# Patient Record
Sex: FEMALE | Race: WHITE | Employment: OTHER | URBAN - METROPOLITAN AREA
[De-identification: names, ages, dates, MRNs, and addresses within clinical notes are randomized per-mention and may not be internally consistent; named-entity substitution may affect disease eponyms.]

---

## 2018-03-09 ENCOUNTER — OFFICE VISIT (OUTPATIENT)
Dept: FAMILY MEDICINE CLINIC | Age: 58
End: 2018-03-09

## 2018-03-09 ENCOUNTER — TELEPHONE (OUTPATIENT)
Dept: FAMILY MEDICINE CLINIC | Age: 58
End: 2018-03-09

## 2018-03-09 VITALS
WEIGHT: 139.8 LBS | HEART RATE: 62 BPM | OXYGEN SATURATION: 98 % | SYSTOLIC BLOOD PRESSURE: 126 MMHG | TEMPERATURE: 97.9 F | HEIGHT: 62 IN | RESPIRATION RATE: 12 BRPM | BODY MASS INDEX: 25.73 KG/M2 | DIASTOLIC BLOOD PRESSURE: 74 MMHG

## 2018-03-09 DIAGNOSIS — E03.9 ACQUIRED HYPOTHYROIDISM: Primary | ICD-10-CM

## 2018-03-09 DIAGNOSIS — N95.1 VAGINAL DRYNESS, MENOPAUSAL: ICD-10-CM

## 2018-03-09 DIAGNOSIS — K58.0 IRRITABLE BOWEL SYNDROME WITH DIARRHEA: ICD-10-CM

## 2018-03-09 DIAGNOSIS — Z85.820 PERSONAL HISTORY OF MALIGNANT MELANOMA OF SKIN: ICD-10-CM

## 2018-03-09 DIAGNOSIS — Z12.39 BREAST CANCER SCREENING: ICD-10-CM

## 2018-03-09 DIAGNOSIS — E03.9 ACQUIRED HYPOTHYROIDISM: ICD-10-CM

## 2018-03-09 DIAGNOSIS — Z77.011 LEAD EXPOSURE: ICD-10-CM

## 2018-03-09 LAB
CORTISOL TOTAL: 7.3 UG/DL
T4 FREE: 1.6 NG/DL (ref 0.9–1.8)
TSH REFLEX FT4: 0.05 UIU/ML (ref 0.27–4.2)

## 2018-03-09 PROCEDURE — G8484 FLU IMMUNIZE NO ADMIN: HCPCS | Performed by: FAMILY MEDICINE

## 2018-03-09 PROCEDURE — 1036F TOBACCO NON-USER: CPT | Performed by: FAMILY MEDICINE

## 2018-03-09 PROCEDURE — 3017F COLORECTAL CA SCREEN DOC REV: CPT | Performed by: FAMILY MEDICINE

## 2018-03-09 PROCEDURE — 99203 OFFICE O/P NEW LOW 30 MIN: CPT | Performed by: FAMILY MEDICINE

## 2018-03-09 PROCEDURE — 3014F SCREEN MAMMO DOC REV: CPT | Performed by: FAMILY MEDICINE

## 2018-03-09 PROCEDURE — G8427 DOCREV CUR MEDS BY ELIG CLIN: HCPCS | Performed by: FAMILY MEDICINE

## 2018-03-09 PROCEDURE — G8419 CALC BMI OUT NRM PARAM NOF/U: HCPCS | Performed by: FAMILY MEDICINE

## 2018-03-09 RX ORDER — LIOTHYRONINE SODIUM 5 UG/1
5 TABLET ORAL DAILY
Refills: 0 | COMMUNITY
Start: 2018-03-09 | End: 2018-03-11

## 2018-03-09 RX ORDER — ESTRADIOL 0.1 MG/D
1 FILM, EXTENDED RELEASE TRANSDERMAL
Refills: 0 | COMMUNITY
Start: 2018-03-12 | End: 2018-03-09

## 2018-03-09 ASSESSMENT — ENCOUNTER SYMPTOMS
RESPIRATORY NEGATIVE: 1
DIARRHEA: 1

## 2018-03-09 NOTE — PROGRESS NOTES
Maryan Romo   YOB: 1960    Date of Visit:  3/9/2018        Chief Complaint   Patient presents with    Established New Doctor     New patient visit     Patient is a 50-year-old female with history of chronic diarrhea, postmenopausal atrophic vaginitis, hypothyroidism and melanoma resending to establish primary care. Patient transitioning PCP due to her prior provider no longer within the insurance network. HPI:       Hypothyroidism. Diagnosed many years ago. Currently treated with levothyroxine and Cytomel. Denies symptoms/concerns. Endorsing adherence to medications. No side effects associated with current treatment. Reports to prior doctor checking cortisol levels which has been persistently high and requests recheck. History of melanoma of the scalp. Status post excision about a decade ago. Denies any new skin lesions. She inquires whether the melanoma had stemmed from food allergies. Gets annual skin checks by dermatology. Requesting referral to establish with new dermatologist due to change in insurance. Atrophic vaginitis. Was on estradiol patch but no longer using. Complaints of symptoms consisting of dryness and pain with sexual intercourse. Reports to symptoms affecting her sexual life at this time. Requests referral to gynecology. Chronic diarrhea. Problem has been waxing and waning since teenage. 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 Symptoms most bothersome in the mornings. Supposedly had a colonoscopy when she turned 49years of age with normal results and is not due to follow-up for 10 years. Prefers not to take medication for the condition. History of lead exposure. Diagnosed a few years ago. Denies current risk factors. Requesting recheck of lead levels.           No Known Allergies        Outpatient Prescriptions Marked as Taking for the 3/9/18 encounter (Office Visit) with Mai Valadez MD   Medication Sig Dispense Refill    estradiol (VIVELLE) 0.1 MG/24HR Place 1 patch onto the skin Twice a Week  0    levothyroxine (SYNTHROID) 88 MCG tablet Take 88 mcg by mouth Daily             Patient's past medical, surgical, social and family histories were reviewed and updated as appropriate. Review of Systems   Constitutional: Negative. HENT: Negative. Respiratory: Negative. Cardiovascular: Negative. Gastrointestinal: Positive for diarrhea (chronic). Musculoskeletal: Negative. Neurological: Negative. Physical Exam   Constitutional: She appears well-developed and well-nourished. No distress. HENT:   Head: Normocephalic and atraumatic. Right Ear: Hearing and external ear normal.   Left Ear: Hearing and external ear normal.   Nose: Nose normal. No rhinorrhea. Eyes: Conjunctivae and EOM are normal. No scleral icterus. Neck: Neck supple. No JVD present. No thyromegaly present. Cardiovascular: Normal rate, regular rhythm and intact distal pulses. Pulmonary/Chest: Effort normal and breath sounds normal.   Abdominal: Soft. Bowel sounds are normal.   Neurological: She is alert. She is not disoriented. Psychiatric: She has a normal mood and affect. Her behavior is normal.   Vitals reviewed. Vitals:    03/09/18 0822   BP: 126/74   Site: Left Arm   Position: Sitting   Cuff Size: Medium Adult   Pulse: 62   Resp: 12   Temp: 97.9 °F (36.6 °C)   TempSrc: Oral   SpO2: 98%   Weight: 139 lb 12.8 oz (63.4 kg)   Height: 5' 2\" (1.575 m)     Body mass index is 25.57 kg/m². Wt Readings from Last 3 Encounters:   03/09/18 139 lb 12.8 oz (63.4 kg)   05/28/15 133 lb (60.3 kg)     BP Readings from Last 3 Encounters:   03/09/18 126/74   05/28/15 127/70            Assessment/Plan     1. Hypothyroidism  - Continue levothyroxine and Cytomel. Check labs. 2. Personal history of malignant melanoma of skin  - Status post excision in 2007.  Dermatology referral for annual skin exam, Brooks Marroquin MD.    3. Vaginal dryness, menopausal  - Off estradiol. Gynecology referral as patient requests, Daya Resendiz MD    4. Irritable bowel syndrome with diarrhea  - Declines medication therapy. Recommend increasing daily fiber intake and maintain adequate hydration. 5. History of lead exposure  - Check serum lead level. 6. Breast cancer screening  - Mammography Screening            Discussed medications with patient, who voiced understanding of their use and indications. All questions answered. Return in about 6 months (around 9/9/2018) for Hypothyroid. Return sometime for annual physical +/- Pap. Please note that some or all of this record was generated using voice recognition software. If there are any questions about the content of this document, please contact the author as some errors in transcription may have occurred.

## 2018-03-11 DIAGNOSIS — E03.9 ACQUIRED HYPOTHYROIDISM: Primary | ICD-10-CM

## 2018-03-11 RX ORDER — LEVOTHYROXINE SODIUM 0.05 MG/1
50 TABLET ORAL DAILY
Qty: 90 TABLET | Refills: 0 | Status: SHIPPED | OUTPATIENT
Start: 2018-03-11 | End: 2018-04-30 | Stop reason: SDUPTHER

## 2018-03-12 DIAGNOSIS — E03.9 ACQUIRED HYPOTHYROIDISM: Primary | ICD-10-CM

## 2018-03-12 LAB — LEAD LEVEL BLOOD: <2 UG/DL (ref 0–4.9)

## 2018-03-16 ENCOUNTER — TELEPHONE (OUTPATIENT)
Dept: FAMILY MEDICINE CLINIC | Age: 58
End: 2018-03-16

## 2018-04-03 ENCOUNTER — TELEPHONE (OUTPATIENT)
Dept: FAMILY MEDICINE CLINIC | Age: 58
End: 2018-04-03

## 2018-04-13 ENCOUNTER — OFFICE VISIT (OUTPATIENT)
Dept: FAMILY MEDICINE CLINIC | Age: 58
End: 2018-04-13

## 2018-04-13 VITALS
HEIGHT: 63 IN | SYSTOLIC BLOOD PRESSURE: 124 MMHG | HEART RATE: 56 BPM | WEIGHT: 142.8 LBS | TEMPERATURE: 97.7 F | DIASTOLIC BLOOD PRESSURE: 80 MMHG | BODY MASS INDEX: 25.3 KG/M2 | OXYGEN SATURATION: 98 % | RESPIRATION RATE: 16 BRPM

## 2018-04-13 DIAGNOSIS — Z00.00 ANNUAL PHYSICAL EXAM: Primary | ICD-10-CM

## 2018-04-13 PROCEDURE — 99396 PREV VISIT EST AGE 40-64: CPT | Performed by: FAMILY MEDICINE

## 2018-04-13 RX ORDER — LIOTHYRONINE SODIUM 5 UG/1
TABLET ORAL
COMMUNITY
Start: 2018-03-26 | End: 2018-04-30 | Stop reason: SDUPTHER

## 2018-04-19 LAB
HPV COMMENT: NORMAL
HPV TYPE 16: NOT DETECTED
HPV TYPE 18: NOT DETECTED
HPVOH (OTHER TYPES): NOT DETECTED

## 2018-04-19 ASSESSMENT — ENCOUNTER SYMPTOMS
RESPIRATORY NEGATIVE: 1
EYES NEGATIVE: 1
GASTROINTESTINAL NEGATIVE: 1

## 2018-04-30 ENCOUNTER — TELEPHONE (OUTPATIENT)
Dept: FAMILY MEDICINE CLINIC | Age: 58
End: 2018-04-30

## 2018-04-30 DIAGNOSIS — E03.9 HYPOTHYROIDISM, UNSPECIFIED TYPE: Primary | ICD-10-CM

## 2018-04-30 RX ORDER — LIOTHYRONINE SODIUM 5 UG/1
5 TABLET ORAL DAILY
Qty: 90 TABLET | Refills: 0 | Status: SHIPPED | OUTPATIENT
Start: 2018-04-30 | End: 2018-09-17

## 2018-04-30 RX ORDER — LEVOTHYROXINE SODIUM 0.05 MG/1
50 TABLET ORAL DAILY
Qty: 90 TABLET | Refills: 0 | Status: SHIPPED | OUTPATIENT
Start: 2018-04-30

## 2018-05-01 ENCOUNTER — TELEPHONE (OUTPATIENT)
Dept: FAMILY MEDICINE CLINIC | Age: 58
End: 2018-05-01

## 2018-09-17 ENCOUNTER — HOSPITAL ENCOUNTER (OUTPATIENT)
Dept: NON INVASIVE DIAGNOSTICS | Age: 58
Discharge: HOME | End: 2018-09-17
Payer: COMMERCIAL

## 2018-09-17 LAB
LV EF: 58 %
LVEF MODALITY: NORMAL

## 2018-09-17 PROCEDURE — 93306 TTE W/DOPPLER COMPLETE: CPT

## 2018-12-07 ENCOUNTER — HOSPITAL ENCOUNTER (OUTPATIENT)
Dept: WOMENS IMAGING | Age: 58
Discharge: HOME | End: 2018-12-07
Payer: COMMERCIAL

## 2018-12-07 DIAGNOSIS — Z12.31 VISIT FOR SCREENING MAMMOGRAM: ICD-10-CM

## 2018-12-07 PROCEDURE — 77067 SCR MAMMO BI INCL CAD: CPT

## 2020-11-09 ENCOUNTER — HOSPITAL ENCOUNTER (EMERGENCY)
Age: 60
Discharge: HOME | End: 2020-11-09
Attending: EMERGENCY MEDICINE
Payer: COMMERCIAL

## 2020-11-09 ENCOUNTER — APPOINTMENT (OUTPATIENT)
Dept: CT IMAGING | Age: 60
End: 2020-11-09
Payer: COMMERCIAL

## 2020-11-09 ENCOUNTER — APPOINTMENT (OUTPATIENT)
Dept: GENERAL RADIOLOGY | Age: 60
End: 2020-11-09
Payer: COMMERCIAL

## 2020-11-09 VITALS
TEMPERATURE: 98.8 F | SYSTOLIC BLOOD PRESSURE: 152 MMHG | DIASTOLIC BLOOD PRESSURE: 72 MMHG | RESPIRATION RATE: 19 BRPM | HEART RATE: 55 BPM | OXYGEN SATURATION: 99 % | BODY MASS INDEX: 24.69 KG/M2 | WEIGHT: 135 LBS

## 2020-11-09 LAB
ALBUMIN SERPL-MCNC: 4.9 G/DL (ref 3.4–5)
ALP BLD-CCNC: 74 U/L (ref 40–129)
ALT SERPL-CCNC: 14 U/L (ref 10–40)
ANION GAP SERPL CALCULATED.3IONS-SCNC: 13 MMOL/L (ref 3–16)
AST SERPL-CCNC: 18 U/L (ref 15–37)
BASOPHILS ABSOLUTE: 0 K/UL (ref 0–0.2)
BASOPHILS RELATIVE PERCENT: 0.6 %
BILIRUB SERPL-MCNC: 0.8 MG/DL (ref 0–1)
BILIRUBIN DIRECT: <0.2 MG/DL (ref 0–0.3)
BILIRUBIN, INDIRECT: NORMAL MG/DL (ref 0–1)
BUN BLDV-MCNC: 16 MG/DL (ref 7–20)
CALCIUM SERPL-MCNC: 10.1 MG/DL (ref 8.3–10.6)
CHLORIDE BLD-SCNC: 100 MMOL/L (ref 99–110)
CO2: 25 MMOL/L (ref 21–32)
CREAT SERPL-MCNC: 0.9 MG/DL (ref 0.6–1.1)
EOSINOPHILS ABSOLUTE: 0.4 K/UL (ref 0–0.6)
EOSINOPHILS RELATIVE PERCENT: 4.9 %
GFR AFRICAN AMERICAN: >60
GFR NON-AFRICAN AMERICAN: >60
GLUCOSE BLD-MCNC: 116 MG/DL (ref 70–99)
HCG QUALITATIVE: NEGATIVE
HCT VFR BLD CALC: 40.6 % (ref 36–48)
HEMOGLOBIN: 13.6 G/DL (ref 12–16)
INR BLD: 0.89 (ref 0.86–1.14)
LIPASE: 66 U/L (ref 13–60)
LYMPHOCYTES ABSOLUTE: 1.5 K/UL (ref 1–5.1)
LYMPHOCYTES RELATIVE PERCENT: 20.7 %
MCH RBC QN AUTO: 30.4 PG (ref 26–34)
MCHC RBC AUTO-ENTMCNC: 33.4 G/DL (ref 31–36)
MCV RBC AUTO: 90.9 FL (ref 80–100)
MONOCYTES ABSOLUTE: 0.8 K/UL (ref 0–1.3)
MONOCYTES RELATIVE PERCENT: 11.1 %
NEUTROPHILS ABSOLUTE: 4.5 K/UL (ref 1.7–7.7)
NEUTROPHILS RELATIVE PERCENT: 62.7 %
PDW BLD-RTO: 12.9 % (ref 12.4–15.4)
PLATELET # BLD: 223 K/UL (ref 135–450)
PMV BLD AUTO: 7.9 FL (ref 5–10.5)
POTASSIUM SERPL-SCNC: 4.3 MMOL/L (ref 3.5–5.1)
PRO-BNP: 61 PG/ML (ref 0–124)
PROTHROMBIN TIME: 10.3 SEC (ref 10–13.2)
RBC # BLD: 4.47 M/UL (ref 4–5.2)
SODIUM BLD-SCNC: 138 MMOL/L (ref 136–145)
TOTAL PROTEIN: 7.4 G/DL (ref 6.4–8.2)
TROPONIN: <0.01 NG/ML
TROPONIN: <0.01 NG/ML
WBC # BLD: 7.2 K/UL (ref 4–11)

## 2020-11-09 PROCEDURE — 84484 ASSAY OF TROPONIN QUANT: CPT

## 2020-11-09 PROCEDURE — 71046 X-RAY EXAM CHEST 2 VIEWS: CPT

## 2020-11-09 PROCEDURE — 93005 ELECTROCARDIOGRAM TRACING: CPT | Performed by: EMERGENCY MEDICINE

## 2020-11-09 PROCEDURE — 2500000003 HC RX 250 WO HCPCS: Performed by: PHYSICIAN ASSISTANT

## 2020-11-09 PROCEDURE — 84703 CHORIONIC GONADOTROPIN ASSAY: CPT

## 2020-11-09 PROCEDURE — 80076 HEPATIC FUNCTION PANEL: CPT

## 2020-11-09 PROCEDURE — 85610 PROTHROMBIN TIME: CPT

## 2020-11-09 PROCEDURE — 96374 THER/PROPH/DIAG INJ IV PUSH: CPT

## 2020-11-09 PROCEDURE — 74177 CT ABD & PELVIS W/CONTRAST: CPT

## 2020-11-09 PROCEDURE — 85025 COMPLETE CBC W/AUTO DIFF WBC: CPT

## 2020-11-09 PROCEDURE — 2580000003 HC RX 258: Performed by: EMERGENCY MEDICINE

## 2020-11-09 PROCEDURE — 6370000000 HC RX 637 (ALT 250 FOR IP): Performed by: EMERGENCY MEDICINE

## 2020-11-09 PROCEDURE — 6360000004 HC RX CONTRAST MEDICATION: Performed by: PHYSICIAN ASSISTANT

## 2020-11-09 PROCEDURE — 83690 ASSAY OF LIPASE: CPT

## 2020-11-09 PROCEDURE — 83880 ASSAY OF NATRIURETIC PEPTIDE: CPT

## 2020-11-09 PROCEDURE — 80048 BASIC METABOLIC PNL TOTAL CA: CPT

## 2020-11-09 PROCEDURE — 99283 EMERGENCY DEPT VISIT LOW MDM: CPT

## 2020-11-09 RX ORDER — 0.9 % SODIUM CHLORIDE 0.9 %
500 INTRAVENOUS SOLUTION INTRAVENOUS ONCE
Status: COMPLETED | OUTPATIENT
Start: 2020-11-09 | End: 2020-11-09

## 2020-11-09 RX ADMIN — SODIUM CHLORIDE 500 ML: 9 INJECTION, SOLUTION INTRAVENOUS at 23:06

## 2020-11-09 RX ADMIN — FAMOTIDINE 20 MG: 10 INJECTION, SOLUTION INTRAVENOUS at 20:28

## 2020-11-09 RX ADMIN — LIDOCAINE HYDROCHLORIDE: 20 SOLUTION ORAL; TOPICAL at 23:06

## 2020-11-09 RX ADMIN — IOPAMIDOL 75 ML: 755 INJECTION, SOLUTION INTRAVENOUS at 21:34

## 2020-11-09 ASSESSMENT — ENCOUNTER SYMPTOMS
BACK PAIN: 0
DIARRHEA: 0
ABDOMINAL PAIN: 1
NAUSEA: 1
SHORTNESS OF BREATH: 0
CHEST TIGHTNESS: 0
VOMITING: 0

## 2020-11-09 ASSESSMENT — PAIN DESCRIPTION - LOCATION: LOCATION: ABDOMEN;STERNUM

## 2020-11-09 ASSESSMENT — HEART SCORE: ECG: 1

## 2020-11-09 ASSESSMENT — PAIN SCALES - GENERAL: PAINLEVEL_OUTOF10: 2

## 2020-11-09 ASSESSMENT — PAIN DESCRIPTION - FREQUENCY: FREQUENCY: INTERMITTENT

## 2020-11-10 LAB
EKG ATRIAL RATE: 57 BPM
EKG ATRIAL RATE: 65 BPM
EKG DIAGNOSIS: NORMAL
EKG DIAGNOSIS: NORMAL
EKG P AXIS: 11 DEGREES
EKG P AXIS: 12 DEGREES
EKG P-R INTERVAL: 132 MS
EKG P-R INTERVAL: 138 MS
EKG Q-T INTERVAL: 418 MS
EKG Q-T INTERVAL: 450 MS
EKG QRS DURATION: 82 MS
EKG QRS DURATION: 82 MS
EKG QTC CALCULATION (BAZETT): 434 MS
EKG QTC CALCULATION (BAZETT): 438 MS
EKG R AXIS: 18 DEGREES
EKG R AXIS: 19 DEGREES
EKG T AXIS: 31 DEGREES
EKG T AXIS: 44 DEGREES
EKG VENTRICULAR RATE: 57 BPM
EKG VENTRICULAR RATE: 65 BPM

## 2020-11-10 PROCEDURE — 93010 ELECTROCARDIOGRAM REPORT: CPT | Performed by: INTERNAL MEDICINE

## 2020-11-10 NOTE — ED PROVIDER NOTES
[facility]        Pt Name: Laisha Robison  MRN: [id number]  [place] 1960  Date of evaluation: 11/9/2020  Provider: Alejandra Isidro PA-C  PCP: KAYDEN Castillo     I have seen and evaluated this patient with my supervising physician Gagan Martinez, *. CHIEF COMPLAINT       Chief Complaint   Patient presents with    Chest Pain     Walk in from PCP due to chest pain that started at 0300. Pt states that the pain is intermittent and feels like her abdominal area is \"turning.: Pt also states that it radiates to her left shoudler and also has SOB with pain onset. Pt EKG completed, on cardiac monitoring. HISTORY OF PRESENT ILLNESS   (Location, Timing/Onset, Context/Setting, Quality, Duration, Modifying Factors, Severity, Associated Signs and Symptoms)  Note limiting factors. Laisha Robison is a 63 y.o. female presents the emergency department at the request of her treating primary care physician Dr. Florian Valdovinos with difficulty as a pertains to epigastric abdominal pain radiating into her chest.  It is reported the patient was awoken with symptoms of the above-mentioned this morning approximately 3:00 in the morning. Since that time she is had some intermittent symptoms. She is having pain and discomfort in the pit of her stomach. She states it feels like it is turning in nature. She states that this symptom has been intermittent and somewhat colicky in nature through the course of the day. It is reported that she does drink alcohol but is not doing so in excess per her report she does take anti-inflammatories as well. There is a burning-like sensation to this and it seems as if it radiates up into her chest.  It is reported that it goes from the pit up into the mid sternal region. There is question of if it radiates to her left shoulder. It is reported that his pain and discomfort has definitely been intermittent. She states it is not constant in any way shape or form. She denies that she is having fevers chills or cough. She denies that she is having any significant shortness of breath despite nursing note stating that she feels short of breath. She denies that she is experiencing unilateral leg pain or swelling denies a history of DVT and denies a history of PE. She is somewhat anxious per her report. She states that her regular doctor suggested she come to the emergency department to make sure that this was not related to the potential for being cardiac in nature. Patient denies having had similar symptoms of this discomfort in the past.  Upon further questioning she denies a history hypertension hypercholesterolemia is not a smoker does not have significant concerning family history and denies cocaine use. States she remembers having an echo done and is done about 2 years ago but states that she has had no additional cardiovascular work-up performed. Patient states at the present time she denies nausea vomiting or diarrhea. She denies hematuria and or flank pain. Denies dysuria, urgency, frequency. No other associated complaint voiced at the present time. Nursing Notes were all reviewed and agreed with or any disagreements were addressed in the HPI. REVIEW OF SYSTEMS    (2-9 systems for level 4, 10 or more for level 5)     Review of Systems   Constitutional: Negative for activity change, chills and fever. Respiratory: Negative for chest tightness and shortness of breath. Cardiovascular: Positive for chest pain. Gastrointestinal: Positive for abdominal pain (epigatric) and nausea. Negative for diarrhea and vomiting. Genitourinary: Negative for dysuria and flank pain. Musculoskeletal: Negative for back pain and myalgias. Neurological: Negative for seizures and headaches. Positives and Pertinent negatives as per HPI.   Except as noted above in the ROS, all other systems were reviewed and Nose normal.      Mouth/Throat:      Lips: Pink. Mouth: Mucous membranes are moist.   Eyes:      General: No scleral icterus. Right eye: No discharge. Left eye: No discharge. Conjunctiva/sclera: Conjunctivae normal.   Neck:      Musculoskeletal: Normal range of motion. Vascular: No JVD. Cardiovascular:      Rate and Rhythm: Normal rate and regular rhythm. Heart sounds: No murmur. No friction rub. No gallop. Pulmonary:      Effort: Pulmonary effort is normal. No accessory muscle usage or respiratory distress. Breath sounds: Normal breath sounds. No wheezing, rhonchi or rales. Abdominal:      General: Abdomen is flat. Bowel sounds are normal. There is no distension. Palpations: Abdomen is soft. Abdomen is not rigid. There is no mass. Tenderness: There is abdominal tenderness in the epigastric area. There is no right CVA tenderness, left CVA tenderness, guarding or rebound. Skin:     General: Skin is warm and dry. Neurological:      Mental Status: She is alert and oriented to person, place, and time. GCS: GCS eye subscore is 4. GCS verbal subscore is 5. GCS motor subscore is 6. Cranial Nerves: No cranial nerve deficit. Sensory: No sensory deficit. Coordination: Coordination normal.   Psychiatric:         Behavior: Behavior normal. Behavior is cooperative.          DIAGNOSTIC RESULTS   LABS:    Labs Reviewed   BASIC METABOLIC PANEL - Abnormal; Notable for the following components:       Result Value    Glucose 116 (*)     All other components within normal limits    Narrative:     Performed at:  [facility] [address], [place], [address] [facility]   Phone [phone]   LIPASE - Abnormal; Notable for the following components:    Lipase 66.0 (*)     All other components within normal limits    Narrative:     Performed at:  [facility] [address], [place], [address] Phone [phone]   TROPONIN    Narrative:     Performed at:  [facility]  [address] [facility],  [place], [address]   Phone [phone]   CBC WITH AUTO DIFFERENTIAL    Narrative:     Performed at:  [facility]  [address] [facility],  [place], [address]   Phone [phone]   HCG, SERUM, QUALITATIVE    Narrative:     Performed at:  [facility] [address],  [place], [address]   Phone [phone]   HEPATIC FUNCTION PANEL    Narrative:     Performed at:  [facility] [address],  [place], [address]   Phone [phone]   BRAIN NATRIURETIC PEPTIDE    Narrative:     Performed at:  [facility] [address] [facility],  [place], [address]   Phone [phone]   PROTIME-INR    Narrative:     Performed at:  [facility] [address],  [place], [address]   Phone [phone]   TROPONIN       All other labs were within normal range or not returned as of this dictation. EKG: All EKG's are interpreted by the Emergency Department Physician in the absence of a cardiologist.  Please see their note for interpretation of EKG. RADIOLOGY:   Non-plain film images such as CT, Ultrasound and MRI are read by the radiologist. Plain radiographic images are visualized and preliminarily interpreted by the ED Provider with the below findings:        Interpretation per the Radiologist below, if available at the time of this note:    XR CHEST (2 VW)   Final Result   No acute process.       Stable compared to the prior study         CT ABDOMEN PELVIS W IV CONTRAST Additional Contrast? None    (Results Pending)         PROCEDURES   Unless otherwise noted below, none     Procedures    CRITICAL CARE TIME   N/A    CONSULTS:  None      EMERGENCY DEPARTMENT COURSE and DIFFERENTIAL DIAGNOSIS/MDM:   Vitals:    Vitals:    11/09/20 2030 11/09/20 2045 11/09/20 2100 11/09/20 2115   BP: (!) 157/76 (!) 145/77 (!) 142/76 (!) 151/82   Pulse: 64 63 62 69   Resp: 24 16 16 17   Temp:       TempSrc:       SpO2: 98% 98% 97% 98%   Weight:           Patient was given the following medications:  Medications   famotidine (PEPCID) injection 20 mg (20 mg Intravenous Given 11/9/20 2028)   iopamidol (ISOVUE-370) 76 % injection 75 mL (75 mLs Intravenous Given 11/9/20 2134)           The patient's detailed history of present illness is documented as above. Upon arrival to the emergency department the patient's vital signs are as documented. The patient is noted to be hemodynamically stable and afebrile. Physical examination findings are as above. IV access was obtained. Laboratory testing and work-up was initiated. Patient symptoms do appear overtly epigastric rather than midsternal.  She was given Pepcid here in the emergency department. Additional labs added to the cardiac work-up because of this. Portable chest x-ray demonstrates no evidence of acute cardiopulmonary process whilst EKG demonstrates a sinus rhythm with a rate of 67. No evidence of acute ST elevation. Please see attending physician details for further EKG interpretation in comparison. CBC demonstrates no evidence of leukocytosis anemia and/or thrombocytopenia. UN is 16 creatinine is 0.9 nonfasting glucose is 116 electrolytes are as documented. LFTs demonstrate no significant elevation. Lipase is mildly elevated at 66. Because of the above-mentioned I did proceed with CT imaging the abdomen and pelvis to rule out pancreatitis as a cause of the patient's pain and discomfort as well as to potentially evaluate for any kind of duodenitis that could be causing the patient's symptoms of pain. I do believe that she is a very low risk of this being cardiac in nature. A delta  troponin is also ordered on this patient at the present time.     As it is the end of my shift, my attending physician will

## 2020-11-10 NOTE — ED PROVIDER NOTES
We did two sets of delayed cardiac markers, both of which are negative. Pt admits to occasional alcohol, NSAIDs. Using antacids at times. The patient's history and evaluation suggests a less emergent etiology for the discomfort. We do not believe the patient is experiencing symptoms from acute coronary syndrome, aortic dissection, pulmonary embolism, tension pneumothorax, myocarditis, pericardial tamponade, Boerhaave syndrome, amongst other chest emergencies. Our suspicion is low for bowel or biliary obstruction, perforated viscous, appendicitis, gonad torsion, vascular occlusion, etc. Karli Bernal was given appropriate discharge instructions. Referral to follow up provider. Heart Score for chest pain patients  History: Slightly Suspicious  ECG: Non-Specifc repolarization disturbance/LBTB/PM  Patient Age: > 39 and < 65 years  *Risk factors for Atherosclerotic disease: Positive family History  Risk Factors: 1 or 2 risk factors  Troponin: < 1X normal limit  Heart Score Total: 3    For further details of [facility] Emergency Department encounter, please see documentation by advanced practice provider Maura Srivastava PA-C.     Labs Reviewed   BASIC METABOLIC PANEL - Abnormal; Notable for the following components:       Result Value    Glucose 116 (*)     All other components within normal limits    Narrative:     Performed at:  [facility] [address], [place], [address]   Phone [phone]   LIPASE - Abnormal; Notable for the following components:    Lipase 66.0 (*)     All other components within normal limits    Narrative:     Performed at:  [facility] [address],  [place], [address]   Phone [phone]   TROPONIN    Narrative:     Performed at:  [facility] [address],  [place], [address]   Phone [phone]   CBC WITH AUTO DIFFERENTIAL    Narrative:     Performed at:  [facility]  [address] [facility],  [place], [address]   Phone [phone]   HCG, SERUM, QUALITATIVE    Narrative:     Performed at:  [facility]  [address],  [place], [address]   Phone [phone]   HEPATIC FUNCTION PANEL    Narrative:     Performed at:  [facility]  [address] [facility],  [place], [address]   Phone [phone]   BRAIN NATRIURETIC PEPTIDE    Narrative:     Performed at:  [facility]  [address] [facility],  [place], [address]   Phone [phone]   PROTIME-INR    Narrative:     Performed at:  [facility]  [address] [facility],  [place], [address]   Phone [phone]   TROPONIN    Narrative:     Performed at:  [facility] [address],  [place], [address]   Phone [phone]     RADIOLOGY:     Plain x-rays were viewed by me:   CT ABDOMEN PELVIS W IV CONTRAST Additional Contrast? None   Final Result   No evidence of acute pancreatitis by CT. Remainder of the exam is   unremarkable, save for mild lumbar spondylosis as described. XR CHEST (2 VW)   Final Result   No acute process.       Stable compared to the prior study           EKG:  Read by me in the absence of a cardiologist shows:  Sinus rhythm, normal rate, normal conduction intervals, normal axis, no acute injury pattern, NSSTTWA, no major change from prior study other than motion artifact    EKG#2 unchanged from the first EKG    Medications administered:  Medications   famotidine (PEPCID) injection 20 mg (20 mg Intravenous Given 11/9/20 2028)   iopamidol (ISOVUE-370) 76 % injection 75 mL (75 mLs Intravenous Given 11/9/20 2134)   0.9 % sodium chloride bolus (0 mLs Intravenous Stopped 11/9/20 2339)   aluminum & magnesium hydroxide-simethicone (MAALOX) 30 mL, lidocaine viscous hcl (XYLOCAINE) 5 mL (GI COCKTAIL) ( Oral Given 11/9/20 2302) Vitals:    11/09/20 2230 11/09/20 2245 11/09/20 2315 11/09/20 2330   BP: (!) 140/76 (!) 149/86  (!) 152/72   Pulse: 61 69 58 55   Resp: 18 14 15 19   Temp:       TempSrc:       SpO2: 97% 100% 98% 99%   Weight:         FOLLOW UP:    Radha Anderson    Schedule an appointment as soon as possible for a visit       [facility]  [address] [facility]  [address]  Go to   If symptoms worsen      FINAL IMPRESSION:    1. Abdominal pain, epigastric    2. Chest pain, unspecified type    3.  Elevated lipase       DISPOSITION Decision To Discharge 11/09/2020 11:32:29 PM        Marquis Emilee MD  11/10/20 1132

## 2020-11-10 NOTE — ED NOTES
Bed: 14  Expected date:   Expected time:   Means of arrival: Walk In  Comments:     Luis Madrigal, [place]  11/09/20 1919

## 2021-02-22 ENCOUNTER — PROCEDURE VISIT (OUTPATIENT)
Dept: SURGERY | Age: 61
End: 2021-02-22
Payer: COMMERCIAL

## 2021-02-22 DIAGNOSIS — D48.9 NEOPLASM OF UNCERTAIN BEHAVIOR: Primary | ICD-10-CM

## 2021-02-22 PROCEDURE — G8484 FLU IMMUNIZE NO ADMIN: HCPCS | Performed by: DERMATOLOGY

## 2021-02-22 PROCEDURE — G8420 CALC BMI NORM PARAMETERS: HCPCS | Performed by: DERMATOLOGY

## 2021-02-22 PROCEDURE — 1036F TOBACCO NON-USER: CPT | Performed by: DERMATOLOGY

## 2021-02-22 PROCEDURE — 3017F COLORECTAL CA SCREEN DOC REV: CPT | Performed by: DERMATOLOGY

## 2021-02-22 PROCEDURE — G8427 DOCREV CUR MEDS BY ELIG CLIN: HCPCS | Performed by: DERMATOLOGY

## 2021-02-22 PROCEDURE — 99204 OFFICE O/P NEW MOD 45 MIN: CPT | Performed by: DERMATOLOGY

## 2021-02-22 RX ORDER — ESTRADIOL 0.1 MG/G
1 CREAM VAGINAL DAILY
COMMUNITY
Start: 2021-02-18

## 2021-02-22 NOTE — PROGRESS NOTES
PRE-PROCEDURE SCREENING    Pacemaker/ICD: No  Difficulty with numbing in the past: No  Local Anesthesia Reaction/passing out: No  Latex or adhesive allergy:  No  Bleeding/Clotting Disorders: No  Anticoagulant Therapy: No  Joint prosthesis: No  Artificial Heart Valve: No  Stroke or Seizures: Yes, hx TIA (approx 15 years ago per pt)  Organ Transplant or Lymphoma: No  Immunosuppression: No  Respiratory Problems: No

## 2021-02-23 NOTE — PROGRESS NOTES
[facility]) Mohs Surgery and Cosmetic Dermatology  Joel Brody MD  [phone]      Mortimer Savory  1960    62 y.o. female     Date of Visit: 2/22/2021        CC:   Dark brown areas around previous melanoma excision on the scalp    History of Present Illness:  1. The pt is here c/o new onset dark brown areas around a previous melanoma excision on the scalp. Pt had an invasive 1.4 mm on the scalp s/p excision in 2008 with dr. Yovany Lozada and negative nodes. She was followed by Dr. Thomas Correia, Dr. Cornell Mccullough then Dr. Dre Enriquez for many years and recently her PCP has been doing her FSE for the last 1.5 years. Pt does get her hair colored brown. Physical Examination       Focused skin exam of the scalp  1. On the scalp there is a well healed surgical scar with alopecia. Surrounding the scar are multiple areas of stuck on brown macules that are easily removed with gentle pressure. Assessment and Plan       1. Retention hyperkeratosis with brown discoloration due to hair dye:  No s/sx of primary pigment disorder or recurrence of melanoma. Pt was reassured and continued diligent surveillance reinforced due to initial advanced stage melanoma. NUMBER/COMPLEXITY OF PROBLEMS ADDRESSED  Acute illness or injury: yes - retention hyperkeratosis   Chronic illness:  yes - history of invasive melanoma  Treatment: gentle debridement    Problem Level: Moderate Risk    DATA ANALYSIS:   Category 1:  Review of prior external note from Dr. Eliezer Leyden and Dr. Mikie Gaviria and Dr. Thomas Correia  Review of pathology report of excision of melanoma and sentinel nodes from 2008  Category 2:  no  Category 3: no    Data Level:   Moderate Risk    RISKS OF COMPLICATIONS AND/OR MORBIDITY OR MORTALITY OF PT MANAGEMENT  OTC Medication:  no  Prescription Medication:  no  Decision regarding surgery:  no  Patient risk factors for surgery:  no  Social determinants affecting diagnosis or treatment:  no    Low Risk